# Patient Record
(demographics unavailable — no encounter records)

---

## 2025-07-16 NOTE — PHYSICAL EXAM
[Midline] : trachea located in midline position [Laryngoscopy Performed] : laryngoscopy was performed, see procedure section for findings [Normal] : no rashes [de-identified] : loud stridor at rest

## 2025-07-16 NOTE — HISTORY OF PRESENT ILLNESS
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 Castro (ENT) who scoped patient in June and noted subglottic stenosis  Had COVID in May with increased difficulty breathing prompting first ENT visit with Dr. Castro  1 year ago, started with dyspnea on exertion and SOB from speaking. Also noted a whistling sound when breathing Saw pulmonologist in April who said her lungs were clear and prescribed Albuterol inhaler without relief of symptoms Intermittent dysphagia and choking episodes with both solids and liquids  Denies history of smoking. Occasional alcohol.  Worse over the last 6 months, started symptoms in august 2024